# Patient Record
Sex: FEMALE | URBAN - METROPOLITAN AREA
[De-identification: names, ages, dates, MRNs, and addresses within clinical notes are randomized per-mention and may not be internally consistent; named-entity substitution may affect disease eponyms.]

---

## 2024-11-02 ENCOUNTER — OFFICE VISIT (OUTPATIENT)
Dept: URGENT CARE | Age: 29
End: 2024-11-02
Payer: COMMERCIAL

## 2024-11-02 VITALS
WEIGHT: 180 LBS | SYSTOLIC BLOOD PRESSURE: 120 MMHG | RESPIRATION RATE: 16 BRPM | HEIGHT: 63 IN | TEMPERATURE: 98.6 F | BODY MASS INDEX: 31.89 KG/M2 | HEART RATE: 91 BPM | OXYGEN SATURATION: 95 % | DIASTOLIC BLOOD PRESSURE: 77 MMHG

## 2024-11-02 DIAGNOSIS — J32.9 SINUSITIS, UNSPECIFIED CHRONICITY, UNSPECIFIED LOCATION: Primary | ICD-10-CM

## 2024-11-02 RX ORDER — LAMOTRIGINE 25 MG/1
TABLET ORAL
COMMUNITY

## 2024-11-02 RX ORDER — AMOXICILLIN 875 MG/1
875 TABLET, FILM COATED ORAL 2 TIMES DAILY
Qty: 20 TABLET | Refills: 0 | Status: SHIPPED | OUTPATIENT
Start: 2024-11-02 | End: 2024-11-12

## 2024-11-02 RX ORDER — ESCITALOPRAM OXALATE 5 MG/5ML
10 SOLUTION ORAL DAILY
COMMUNITY

## 2024-11-02 ASSESSMENT — ENCOUNTER SYMPTOMS
CARDIOVASCULAR NEGATIVE: 1
ENDOCRINE NEGATIVE: 1
GASTROINTESTINAL NEGATIVE: 1
EYES NEGATIVE: 1
FEVER: 1
ALLERGIC/IMMUNOLOGIC NEGATIVE: 1
COUGH: 0
HEMATOLOGIC/LYMPHATIC NEGATIVE: 1
NEUROLOGICAL NEGATIVE: 1
SINUS COMPLAINT: 1
PSYCHIATRIC NEGATIVE: 1
MUSCULOSKELETAL NEGATIVE: 1

## 2025-01-10 ENCOUNTER — OFFICE VISIT (OUTPATIENT)
Dept: URGENT CARE | Age: 30
End: 2025-01-10
Payer: COMMERCIAL

## 2025-01-10 VITALS
DIASTOLIC BLOOD PRESSURE: 82 MMHG | HEIGHT: 63 IN | HEART RATE: 89 BPM | TEMPERATURE: 98.2 F | RESPIRATION RATE: 16 BRPM | BODY MASS INDEX: 31.89 KG/M2 | SYSTOLIC BLOOD PRESSURE: 126 MMHG | OXYGEN SATURATION: 98 % | WEIGHT: 180 LBS

## 2025-01-10 DIAGNOSIS — R05.9 COUGH, UNSPECIFIED TYPE: ICD-10-CM

## 2025-01-10 DIAGNOSIS — U07.1 COVID: Primary | ICD-10-CM

## 2025-01-10 LAB
POC RAPID INFLUENZA A: NEGATIVE
POC RAPID INFLUENZA B: NEGATIVE
POC RAPID STREP: NEGATIVE
POC SARS-COV-2 AG BINAX: ABNORMAL

## 2025-01-10 ASSESSMENT — PATIENT HEALTH QUESTIONNAIRE - PHQ9
SUM OF ALL RESPONSES TO PHQ9 QUESTIONS 1 AND 2: 0
2. FEELING DOWN, DEPRESSED OR HOPELESS: NOT AT ALL
1. LITTLE INTEREST OR PLEASURE IN DOING THINGS: NOT AT ALL

## 2025-01-10 ASSESSMENT — PAIN SCALES - GENERAL: PAINLEVEL_OUTOF10: 3

## 2025-01-10 NOTE — Clinical Note
January 10, 2025     Patient: Milagro Swain   YOB: 1995   Date of Visit: 1/10/2025       To Whom It May Concern:    It is my medical opinion that Milagro Swain {Work release (duty restriction):21529}.    If you have any questions or concerns, please don't hesitate to call.         Sincerely,        Virginie Dave, APRN-CNP    CC: No Recipients

## 2025-01-10 NOTE — PROGRESS NOTES
"Subjective   Patient ID: Milagro Swain is a 29 y.o. female. They present today with a chief complaint of Cough, Sore Throat, Headache, and Sinus Problem (X1 day ).    History of Present Illness  Pt presents to the  with the c/o ST, nasal congestion and cough x 1 day. She denies fever, sob, cp or pain with deep inspiration. No other associated symptoms or concerns to address at this time. (+) COVID today in UC, neg flu and strep.           Past Medical History  Allergies as of 01/10/2025    (No Known Allergies)       (Not in a hospital admission)       History reviewed. No pertinent past medical history.    History reviewed. No pertinent surgical history.     reports that she has never smoked. She has never used smokeless tobacco.    Review of Systems  Review of Systems  10 point ROS completed and all are negative other than what is stated in the current HPI                               Objective    Vitals:    01/10/25 1641   BP: 126/82   Pulse: 89   Resp: 16   Temp: 36.8 °C (98.2 °F)   SpO2: 98%   Weight: 81.6 kg (180 lb)   Height: 1.6 m (5' 3\")     Patient's last menstrual period was 12/15/2024.    Physical Exam  Vitals and nursing note reviewed.   Constitutional:       General: She is not in acute distress.     Appearance: Normal appearance. She is obese. She is not ill-appearing.   HENT:      Head: Normocephalic and atraumatic.      Nose: Congestion and rhinorrhea present.      Mouth/Throat:      Mouth: Mucous membranes are moist.      Comments: (+)postnasal discharge  Cardiovascular:      Rate and Rhythm: Normal rate and regular rhythm.      Heart sounds: Normal heart sounds.   Pulmonary:      Effort: Pulmonary effort is normal.      Breath sounds: Normal breath sounds. No wheezing or rhonchi.   Abdominal:      Palpations: Abdomen is soft.      Tenderness: There is no abdominal tenderness.   Musculoskeletal:      Cervical back: No tenderness.   Lymphadenopathy:      Cervical: No cervical adenopathy.   Skin:    "  General: Skin is warm and dry.      Findings: No rash.   Neurological:      Mental Status: She is alert and oriented to person, place, and time.   Psychiatric:         Behavior: Behavior normal.         Procedures    Point of Care Test & Imaging Results from this visit  Results for orders placed or performed in visit on 01/10/25   POCT Influenza A/B manually resulted   Result Value Ref Range    POC Rapid Influenza A Negative Negative    POC Rapid Influenza B Negative Negative   POCT rapid strep A manually resulted   Result Value Ref Range    POC Rapid Strep Negative Negative   POCT Covid-19 Rapid Antigen   Result Value Ref Range    POC KYLER-COV-2 AG Positive test for SARS-CoV-2 (antigen detected) (A) Presumptive negative test for SARS-CoV-2 (no antigen detected)      No results found.    Diagnostic study results (if any) were reviewed by JOSEFINA Castrejon.    Assessment/Plan   Allergies, medications, history, and pertinent labs/EKGs/Imaging reviewed by JOSEFINA Castrejon.     Medical Decision Making  COVID:  - OTC symptomatic treatment at this time  - Advised on CDC protocol  - Advised on s/s to seek emergent care for  - Good oral hydration and rest  - Tylenol/Motrin as needed for fever/body aches    Acute Cough:  - Secondary to COVID virus  - No antibiotics as this is  virus and antibiotics are not warranted  - Good oral hydration; avoid milk products  - San[employer]'s Vapor rub; humidifier; warm showers  - Take medications as prescribed  - Advised on s/s to seek emergent care for  - f/u with PCP in the next 3-5 days if no better    Orders and Diagnoses  Diagnoses and all orders for this visit:  Cough, unspecified type  -     POCT Influenza A/B manually resulted  -     POCT rapid strep A manually resulted  -     POCT Covid-19 Rapid Antigen  COVID      Medical Admin Record      Patient disposition: Home    Electronically signed by JOSEFINA Castrejon  5:07 PM

## 2025-01-10 NOTE — PATIENT INSTRUCTIONS
COVID:  - OTC symptomatic treatment at this time  - Advised on CDC protocol  - Advised on s/s to seek emergent care for  - Good oral hydration and rest  - Tylenol/Motrin as needed for fever/body aches    Acute Cough:  - Secondary to COVID virus  - No antibiotics as this is  virus and antibiotics are not warranted  - Good oral hydration; avoid milk products  - Sanford's Vapor rub; humidifier; warm showers  - Take medications as prescribed  - Advised on s/s to seek emergent care for  - f/u with PCP in the next 3-5 days if no better

## 2025-01-10 NOTE — LETTER
January 10, 2025     Patient: Milagro Swain   YOB: 1995   Date of Visit: 1/10/2025       To Whom It May Concern:    Milagro Swain was seen in my clinic on 1/10/2025 at 4:15 pm. Please excuse Milagro for her absence from work today 1/10/2025, 1/11/2025 and 1/12/2025 d/t COVID. She may return to work on 1/13/2025 with no restrictions as long as she is fever free for 24 hours. Discussed CDC protocol with patient. Encourage her to continue to wear mask for addition 3 days while at work (until 1/16/2025).    If you have any questions or concerns, please don't hesitate to call.         Sincerely,         KIMMIE Castrejon-CNP        CC: No Recipients